# Patient Record
Sex: FEMALE | Race: AMERICAN INDIAN OR ALASKA NATIVE
[De-identification: names, ages, dates, MRNs, and addresses within clinical notes are randomized per-mention and may not be internally consistent; named-entity substitution may affect disease eponyms.]

---

## 2018-04-27 ENCOUNTER — HOSPITAL ENCOUNTER (OUTPATIENT)
Dept: HOSPITAL 31 - C.SPRAD | Age: 73
Discharge: HOME | End: 2018-04-27
Attending: RADIOLOGY
Payer: MEDICARE

## 2018-04-27 DIAGNOSIS — E04.2: Primary | ICD-10-CM

## 2018-04-27 NOTE — [email or web address]
Surgeon's Initial Post Op Note





- Surgeon's Notes


Surgeon: Arturo Sims MD


Assistant: NONE


Type of Anesthesia: Local


Pre-Operative Diagnosis: Right thyroid nodule


Operative Findings: US showed a large right thyroid and goiter


Post-Operative Diagnosis: Right thyroid nodule


Operation Performed: US guided right thyroid FNA


Specimen/Specimens Removed: 25 g FNA x 5 passes


Estimated Blood Loss: EBL {In ML}: 2


Blood Products Given: N/A


Drains Used: No Drains


Post-Op Condition: Fair


Date of Surgery/Procedure: 04/27/18


Time of Surgery/Procedure: 11:00

## 2018-04-27 NOTE — US
PROCEDURE:  Date of Procedure: 04/27/2018



PROCEDURE: 



1. Ultrasound guided FNA of right thyroid nodule, CPT 69531



2. Ultrasound guidance for FNA, 64460



Medications: 3cc 1% Lidocaine







HISTORY:

 Enlarged right thyroid nodule. 



TECHNIQUE:

 Following informed consent and procedure time-out, a limited 

ultrasound patient's neck confirmed the presence of a 2 cm complex 

right thyroid nodule which is predominantly solid. 



After the patient's neck was prepped and draped in the usual sterile 

fashion, the skin was anesthetized with 1% lidocaine.  

Ultrasound-guided fine needle aspiration was then performed of the 

dominant right thyroid nodule. A total of 4 passes were made into the 

nodule with 25 gauge needle under ultrasound guidance.  The FNA 

specimen was sent for routine pathology. 



Post biopsy ultrasound showed no hematoma. 



IMPRESSION:





Ultrasound-guided FNA of the dominant right thyroid nodule.

## 2018-12-19 ENCOUNTER — HOSPITAL ENCOUNTER (OUTPATIENT)
Dept: HOSPITAL 42 - ED | Age: 73
Setting detail: OBSERVATION
LOS: 2 days | Discharge: NURSING, REHAB OR LONG-TERM CARE | End: 2018-12-21
Attending: FAMILY MEDICINE | Admitting: INTERNAL MEDICINE
Payer: MEDICARE

## 2018-12-19 VITALS — BODY MASS INDEX: 25.7 KG/M2

## 2018-12-19 DIAGNOSIS — Z79.01: ICD-10-CM

## 2018-12-19 DIAGNOSIS — I13.0: Primary | ICD-10-CM

## 2018-12-19 DIAGNOSIS — E11.22: ICD-10-CM

## 2018-12-19 DIAGNOSIS — R07.89: ICD-10-CM

## 2018-12-19 DIAGNOSIS — I08.3: ICD-10-CM

## 2018-12-19 DIAGNOSIS — I50.9: ICD-10-CM

## 2018-12-19 DIAGNOSIS — G30.1: ICD-10-CM

## 2018-12-19 DIAGNOSIS — E78.5: ICD-10-CM

## 2018-12-19 DIAGNOSIS — I48.0: ICD-10-CM

## 2018-12-19 DIAGNOSIS — I48.92: ICD-10-CM

## 2018-12-19 DIAGNOSIS — N18.4: ICD-10-CM

## 2018-12-19 DIAGNOSIS — Z79.4: ICD-10-CM

## 2018-12-19 DIAGNOSIS — F02.80: ICD-10-CM

## 2018-12-19 LAB
ALBUMIN SERPL-MCNC: 3.1 G/DL (ref 3–4.8)
ALBUMIN/GLOB SERPL: 1 {RATIO} (ref 1.1–1.8)
ALT SERPL-CCNC: 20 U/L (ref 7–56)
APTT BLD: 40 SECONDS (ref 25.1–36.5)
AST SERPL-CCNC: 20 U/L (ref 14–36)
BASOPHILS # BLD AUTO: 0.03 K/MM3 (ref 0–2)
BASOPHILS NFR BLD: 0.4 % (ref 0–3)
BUN SERPL-MCNC: 23 MG/DL (ref 7–21)
CALCIUM SERPL-MCNC: 9.6 MG/DL (ref 8.4–10.5)
EOSINOPHIL # BLD: 0.3 10*3/UL (ref 0–0.7)
EOSINOPHIL NFR BLD: 3.6 % (ref 1.5–5)
ERYTHROCYTE [DISTWIDTH] IN BLOOD BY AUTOMATED COUNT: 16 % (ref 11.5–14.5)
GFR NON-AFRICAN AMERICAN: 21
GRANULOCYTES # BLD: 5.52 10*3/UL (ref 1.4–6.5)
GRANULOCYTES NFR BLD: 66 % (ref 50–68)
HGB BLD-MCNC: 10.5 G/DL (ref 12–16)
INR PPP: 2.14
LYMPHOCYTES # BLD: 1.4 10*3/UL (ref 1.2–3.4)
LYMPHOCYTES NFR BLD AUTO: 16.9 % (ref 22–35)
MCH RBC QN AUTO: 26.9 PG (ref 25–35)
MCHC RBC AUTO-ENTMCNC: 31 G/DL (ref 31–37)
MCV RBC AUTO: 86.7 FL (ref 80–105)
MONOCYTES # BLD AUTO: 1.1 10*3/UL (ref 0.1–0.6)
MONOCYTES NFR BLD: 13.1 % (ref 1–6)
PLATELET # BLD: 371 10^3/UL (ref 120–450)
PMV BLD AUTO: 9.5 FL (ref 7–11)
PROTHROMBIN TIME: 25 SECONDS (ref 9.4–12.5)
RBC # BLD AUTO: 3.91 10^6/UL (ref 3.5–6.1)
TROPONIN I SERPL-MCNC: 0.01 NG/ML
TROPONIN I SERPL-MCNC: 0.02 NG/ML
TROPONIN I SERPL-MCNC: 0.02 NG/ML
WBC # BLD AUTO: 8.4 10^3/UL (ref 4.5–11)

## 2018-12-19 RX ADMIN — Medication SCH MG: at 18:38

## 2018-12-19 RX ADMIN — INSULIN HUMAN SCH: 100 INJECTION, SOLUTION PARENTERAL at 21:31

## 2018-12-20 VITALS — HEART RATE: 100 BPM

## 2018-12-20 LAB
ALBUMIN SERPL-MCNC: 3.3 G/DL (ref 3–4.8)
ALBUMIN/GLOB SERPL: 1.1 {RATIO} (ref 1.1–1.8)
ALT SERPL-CCNC: 21 U/L (ref 7–56)
AST SERPL-CCNC: 17 U/L (ref 14–36)
BASOPHILS # BLD AUTO: 0.04 K/MM3 (ref 0–2)
BASOPHILS NFR BLD: 0.4 % (ref 0–3)
BUN SERPL-MCNC: 23 MG/DL (ref 7–21)
CALCIUM SERPL-MCNC: 9.6 MG/DL (ref 8.4–10.5)
EOSINOPHIL # BLD: 0.1 10*3/UL (ref 0–0.7)
EOSINOPHIL NFR BLD: 1.3 % (ref 1.5–5)
ERYTHROCYTE [DISTWIDTH] IN BLOOD BY AUTOMATED COUNT: 16.1 % (ref 11.5–14.5)
GFR NON-AFRICAN AMERICAN: 22
GRANULOCYTES # BLD: 7.4 10*3/UL (ref 1.4–6.5)
GRANULOCYTES NFR BLD: 71.2 % (ref 50–68)
HDLC SERPL-MCNC: 44 MG/DL (ref 29–60)
HGB BLD-MCNC: 10.8 G/DL (ref 12–16)
INR PPP: 1.93
LDLC SERPL-MCNC: 56 MG/DL (ref 0–129)
LYMPHOCYTES # BLD: 1.6 10*3/UL (ref 1.2–3.4)
LYMPHOCYTES NFR BLD AUTO: 15.6 % (ref 22–35)
MCH RBC QN AUTO: 27.2 PG (ref 25–35)
MCHC RBC AUTO-ENTMCNC: 31.2 G/DL (ref 31–37)
MCV RBC AUTO: 87.2 FL (ref 80–105)
MONOCYTES # BLD AUTO: 1.2 10*3/UL (ref 0.1–0.6)
MONOCYTES NFR BLD: 11.5 % (ref 1–6)
PLATELET # BLD: 352 10^3/UL (ref 120–450)
PMV BLD AUTO: 9.7 FL (ref 7–11)
PROTHROMBIN TIME: 22.5 SECONDS (ref 9.4–12.5)
RBC # BLD AUTO: 3.97 10^6/UL (ref 3.5–6.1)
WBC # BLD AUTO: 10.4 10^3/UL (ref 4.5–11)

## 2018-12-20 RX ADMIN — Medication SCH MG: at 18:02

## 2018-12-20 RX ADMIN — INSULIN HUMAN SCH: 100 INJECTION, SOLUTION PARENTERAL at 13:31

## 2018-12-20 RX ADMIN — INSULIN HUMAN SCH: 100 INJECTION, SOLUTION PARENTERAL at 17:59

## 2018-12-20 RX ADMIN — Medication SCH: at 13:08

## 2018-12-20 RX ADMIN — INSULIN HUMAN SCH: 100 INJECTION, SOLUTION PARENTERAL at 08:01

## 2018-12-20 RX ADMIN — Medication SCH MG: at 08:48

## 2018-12-20 RX ADMIN — POTASSIUM CHLORIDE SCH MEQ: 20 TABLET, EXTENDED RELEASE ORAL at 08:17

## 2018-12-21 VITALS
TEMPERATURE: 98.1 F | DIASTOLIC BLOOD PRESSURE: 108 MMHG | RESPIRATION RATE: 18 BRPM | SYSTOLIC BLOOD PRESSURE: 152 MMHG | HEART RATE: 74 BPM

## 2018-12-21 VITALS — OXYGEN SATURATION: 95 %

## 2018-12-21 RX ADMIN — INSULIN HUMAN SCH: 100 INJECTION, SOLUTION PARENTERAL at 08:21

## 2018-12-21 RX ADMIN — POTASSIUM CHLORIDE SCH MEQ: 20 TABLET, EXTENDED RELEASE ORAL at 10:06
